# Patient Record
Sex: FEMALE
[De-identification: names, ages, dates, MRNs, and addresses within clinical notes are randomized per-mention and may not be internally consistent; named-entity substitution may affect disease eponyms.]

---

## 2019-09-26 PROBLEM — Z00.00 ENCOUNTER FOR PREVENTIVE HEALTH EXAMINATION: Status: ACTIVE | Noted: 2019-09-26

## 2019-09-27 ENCOUNTER — APPOINTMENT (OUTPATIENT)
Dept: ORTHOPEDIC SURGERY | Facility: CLINIC | Age: 29
End: 2019-09-27

## 2019-10-03 ENCOUNTER — APPOINTMENT (OUTPATIENT)
Dept: ORTHOPEDIC SURGERY | Facility: CLINIC | Age: 29
End: 2019-10-03
Payer: MEDICAID

## 2019-10-03 VITALS
WEIGHT: 115 LBS | HEIGHT: 63 IN | HEART RATE: 79 BPM | DIASTOLIC BLOOD PRESSURE: 66 MMHG | SYSTOLIC BLOOD PRESSURE: 101 MMHG | BODY MASS INDEX: 20.38 KG/M2

## 2019-10-03 DIAGNOSIS — M25.571 PAIN IN RIGHT ANKLE AND JOINTS OF RIGHT FOOT: ICD-10-CM

## 2019-10-03 PROCEDURE — 99204 OFFICE O/P NEW MOD 45 MIN: CPT

## 2019-10-03 PROCEDURE — 73610 X-RAY EXAM OF ANKLE: CPT | Mod: RT

## 2019-10-06 NOTE — DISCUSSION/SUMMARY
[de-identified] : The patient presents with an ankle sprain.   discussed the spectrum of sprain injuries; the majority of which responded well to nonoperative modalities of treatment, which include relative rest over the next 2 weeks, NSAID's, ice, compressive wraps and gradual return to weight bearing activity as tolerated.   Overall, she is improving.  I have provided a lace up ankle brace for activity.  I have also referred her to PT to improve strength, ROM, and proprioception.  Follow up prn.\par \par Lara Ventura MD, EdM\par Sports Medicine [facility]\par \par

## 2019-10-06 NOTE — PHYSICAL EXAM
[de-identified] : Constitutional: Well-nourished, well-developed, No acute distress\par Respiratory:  Good respiratory effort, no SOB\par Lymphatic: No regional lymphadenopathy, no lymphedema\par Psychiatric: Pleasant and normal affect, alert and oriented x3\par Skin: Clean dry and intact B/L UE/LE\par Musculoskeletal: normal except where as noted in regional exam\par \par \par right Ankle:\par APPEARANCE: no marked deformities, no swelling or malalignment\par POSITIVE TENDERNESS: ATFL\par NONTENDER: medial malleolus, lateral malleolus, tibialis posterior tendon, achilles tendon, no marked thickening of tendon,  CFL, PTFL, anterior tibiofibular ligament (high ankle), sinus tarsus, deltoid ligaments, 5th metatarsal. \par RANGE OF MOTION: full & painless. \par RESISTIVE TESTING: painless resisted dorsiflexion, plantar flexion, inversion & eversion. \par SPECIAL TESTS: neg anterior drawer. neg talar tilt. neg Kleiger's\par NEURO: Normal sensation of LE, DTRs 2+/4 patella and achilles\par PULSES: 2+ DP/PT pulses\par B/L Hips: No asymmetry, malalignment, or swelling, Full ROM, 5/5 strength in flexion/ext, IR/ER, Abd/Add, Joints stable\par B/L Knees: No asymmetry, malalignment, or swelling, Full ROM, 5/5 strength in Flex/Ext, Joints stable\par BIOMECHANICAL EXAM: no marked leg length discrepancy, no marked foot pronation. Normal gait and station\par \par \par  [de-identified] : \par The following radiographs were ordered and read by me during this patient's visit. I reviewed each radiograph in detail with the patient and discussed the findings as highlighted below. \par \par 3 views of the right ankle were obtained today that show no fracture, or dislocation. There are no degenerative changes seen. There is no malalignment. No obvious osseous abnormality. Otherwise unremarkable.\par

## 2019-10-06 NOTE — HISTORY OF PRESENT ILLNESS
[de-identified] : Clara is a 29F who presents with right ankle pain. The pain began after she fell down the stairs on 9/23.  Since that time she has had pain and swelling ath the ankle.  She is able to weightbear, but with pain.  Overall, pain has improved, but it is dull in quality. No numbness or weaknes so the foot.  No prior injury.\par  [Improving] : improving [___ wks] : [unfilled] week(s) ago [1] : a current pain level of 1/10 [Walking] : worsened by walking [Rest] : relieved by rest [de-identified] : e